# Patient Record
Sex: FEMALE | Race: WHITE | NOT HISPANIC OR LATINO | URBAN - METROPOLITAN AREA
[De-identification: names, ages, dates, MRNs, and addresses within clinical notes are randomized per-mention and may not be internally consistent; named-entity substitution may affect disease eponyms.]

---

## 2022-07-19 PROBLEM — E66.3 OVERWEIGHT: Status: ACTIVE | Noted: 2020-08-04

## 2022-08-23 ENCOUNTER — OFFICE (OUTPATIENT)
Dept: URBAN - METROPOLITAN AREA PATHOLOGY 1 | Facility: PATHOLOGY | Age: 67
End: 2022-08-23
Payer: COMMERCIAL

## 2022-08-23 ENCOUNTER — AMBULATORY SURGICAL CENTER (OUTPATIENT)
Dept: URBAN - METROPOLITAN AREA SURGERY 4 | Facility: SURGERY | Age: 67
End: 2022-08-23
Payer: COMMERCIAL

## 2022-08-23 VITALS
HEART RATE: 69 BPM | OXYGEN SATURATION: 99 % | RESPIRATION RATE: 12 BRPM | DIASTOLIC BLOOD PRESSURE: 49 MMHG | RESPIRATION RATE: 11 BRPM | HEART RATE: 88 BPM | RESPIRATION RATE: 16 BRPM | RESPIRATION RATE: 16 BRPM | DIASTOLIC BLOOD PRESSURE: 41 MMHG | SYSTOLIC BLOOD PRESSURE: 93 MMHG | DIASTOLIC BLOOD PRESSURE: 76 MMHG | HEART RATE: 89 BPM | HEART RATE: 66 BPM | DIASTOLIC BLOOD PRESSURE: 58 MMHG | HEIGHT: 64 IN | HEART RATE: 79 BPM | HEART RATE: 77 BPM | HEART RATE: 81 BPM | SYSTOLIC BLOOD PRESSURE: 114 MMHG | SYSTOLIC BLOOD PRESSURE: 94 MMHG | DIASTOLIC BLOOD PRESSURE: 38 MMHG | HEIGHT: 64 IN | DIASTOLIC BLOOD PRESSURE: 46 MMHG | OXYGEN SATURATION: 98 % | HEART RATE: 79 BPM | SYSTOLIC BLOOD PRESSURE: 148 MMHG | DIASTOLIC BLOOD PRESSURE: 62 MMHG | DIASTOLIC BLOOD PRESSURE: 46 MMHG | OXYGEN SATURATION: 96 % | HEART RATE: 75 BPM | WEIGHT: 242 LBS | SYSTOLIC BLOOD PRESSURE: 94 MMHG | RESPIRATION RATE: 13 BRPM | SYSTOLIC BLOOD PRESSURE: 93 MMHG | OXYGEN SATURATION: 97 % | HEART RATE: 66 BPM | DIASTOLIC BLOOD PRESSURE: 49 MMHG | OXYGEN SATURATION: 98 % | SYSTOLIC BLOOD PRESSURE: 134 MMHG | SYSTOLIC BLOOD PRESSURE: 81 MMHG | DIASTOLIC BLOOD PRESSURE: 41 MMHG | HEART RATE: 89 BPM | TEMPERATURE: 97.8 F | SYSTOLIC BLOOD PRESSURE: 118 MMHG | HEART RATE: 81 BPM | SYSTOLIC BLOOD PRESSURE: 97 MMHG | DIASTOLIC BLOOD PRESSURE: 58 MMHG | DIASTOLIC BLOOD PRESSURE: 47 MMHG | SYSTOLIC BLOOD PRESSURE: 81 MMHG | SYSTOLIC BLOOD PRESSURE: 114 MMHG | SYSTOLIC BLOOD PRESSURE: 134 MMHG | HEART RATE: 80 BPM | HEART RATE: 75 BPM | HEART RATE: 80 BPM | SYSTOLIC BLOOD PRESSURE: 97 MMHG | HEART RATE: 106 BPM | DIASTOLIC BLOOD PRESSURE: 47 MMHG | RESPIRATION RATE: 12 BRPM | DIASTOLIC BLOOD PRESSURE: 43 MMHG | DIASTOLIC BLOOD PRESSURE: 76 MMHG | RESPIRATION RATE: 11 BRPM | SYSTOLIC BLOOD PRESSURE: 148 MMHG | RESPIRATION RATE: 10 BRPM | TEMPERATURE: 97.8 F | WEIGHT: 242 LBS | DIASTOLIC BLOOD PRESSURE: 43 MMHG | RESPIRATION RATE: 13 BRPM | OXYGEN SATURATION: 99 % | HEART RATE: 88 BPM | DIASTOLIC BLOOD PRESSURE: 62 MMHG | OXYGEN SATURATION: 97 % | OXYGEN SATURATION: 96 % | HEART RATE: 106 BPM | RESPIRATION RATE: 10 BRPM | DIASTOLIC BLOOD PRESSURE: 38 MMHG | HEART RATE: 77 BPM | HEART RATE: 69 BPM | SYSTOLIC BLOOD PRESSURE: 118 MMHG

## 2022-08-23 DIAGNOSIS — Z12.11 ENCOUNTER FOR SCREENING FOR MALIGNANT NEOPLASM OF COLON: ICD-10-CM

## 2022-08-23 DIAGNOSIS — D12.0 BENIGN NEOPLASM OF CECUM: ICD-10-CM

## 2022-08-23 DIAGNOSIS — D12.2 BENIGN NEOPLASM OF ASCENDING COLON: ICD-10-CM

## 2022-08-23 PROBLEM — K63.5 POLYP OF COLON: Status: ACTIVE | Noted: 2022-08-23

## 2022-08-23 PROCEDURE — 88305 TISSUE EXAM BY PATHOLOGIST: CPT | Performed by: PATHOLOGY

## 2022-08-23 PROCEDURE — 45385 COLONOSCOPY W/LESION REMOVAL: CPT | Mod: 33 | Performed by: INTERNAL MEDICINE

## 2022-08-29 LAB
PDF: PDF REPORT: (no result)
PDF: PDF REPORT: (no result)

## 2023-02-16 ENCOUNTER — OFFICE (OUTPATIENT)
Dept: URBAN - METROPOLITAN AREA CLINIC 13 | Facility: CLINIC | Age: 68
End: 2023-02-16
Payer: MEDICARE

## 2023-02-16 VITALS
HEIGHT: 64 IN | WEIGHT: 235 LBS | SYSTOLIC BLOOD PRESSURE: 142 MMHG | HEART RATE: 96 BPM | DIASTOLIC BLOOD PRESSURE: 86 MMHG | OXYGEN SATURATION: 97 %

## 2023-02-16 DIAGNOSIS — E11.43 TYPE 2 DIABETES MELLITUS WITH DIABETIC AUTONOMIC (POLY)NEURO: ICD-10-CM

## 2023-02-16 DIAGNOSIS — K21.00 GASTRO-ESOPHAGEAL REFLUX DISEASE WITH ESOPHAGITIS, WITHOUT B: ICD-10-CM

## 2023-02-16 DIAGNOSIS — R13.10 DYSPHAGIA, UNSPECIFIED: ICD-10-CM

## 2023-02-16 DIAGNOSIS — Z86.010 PERSONAL HISTORY OF COLONIC POLYPS: ICD-10-CM

## 2023-02-16 DIAGNOSIS — E66.01 MORBID (SEVERE) OBESITY DUE TO EXCESS CALORIES: ICD-10-CM

## 2023-02-16 PROCEDURE — 99214 OFFICE O/P EST MOD 30 MIN: CPT | Performed by: INTERNAL MEDICINE

## 2023-02-27 ENCOUNTER — OFFICE (OUTPATIENT)
Dept: URBAN - METROPOLITAN AREA PATHOLOGY 1 | Facility: PATHOLOGY | Age: 68
End: 2023-02-27
Payer: MEDICARE

## 2023-02-27 ENCOUNTER — AMBULATORY SURGICAL CENTER (OUTPATIENT)
Dept: URBAN - METROPOLITAN AREA SURGERY 4 | Facility: SURGERY | Age: 68
End: 2023-02-27
Payer: MEDICARE

## 2023-02-27 VITALS
DIASTOLIC BLOOD PRESSURE: 68 MMHG | SYSTOLIC BLOOD PRESSURE: 109 MMHG | SYSTOLIC BLOOD PRESSURE: 110 MMHG | DIASTOLIC BLOOD PRESSURE: 60 MMHG | HEART RATE: 106 BPM | HEART RATE: 88 BPM | DIASTOLIC BLOOD PRESSURE: 78 MMHG | DIASTOLIC BLOOD PRESSURE: 77 MMHG | HEART RATE: 92 BPM | RESPIRATION RATE: 16 BRPM | OXYGEN SATURATION: 96 % | RESPIRATION RATE: 4 BRPM | HEIGHT: 64 IN | OXYGEN SATURATION: 96 % | SYSTOLIC BLOOD PRESSURE: 109 MMHG | HEART RATE: 84 BPM | TEMPERATURE: 98.1 F | HEART RATE: 90 BPM | RESPIRATION RATE: 4 BRPM | HEART RATE: 90 BPM | OXYGEN SATURATION: 95 % | SYSTOLIC BLOOD PRESSURE: 119 MMHG | HEART RATE: 106 BPM | OXYGEN SATURATION: 232 % | HEART RATE: 84 BPM | OXYGEN SATURATION: 95 % | WEIGHT: 232 LBS | RESPIRATION RATE: 16 BRPM | SYSTOLIC BLOOD PRESSURE: 122 MMHG | OXYGEN SATURATION: 97 % | DIASTOLIC BLOOD PRESSURE: 64 MMHG | OXYGEN SATURATION: 98 % | DIASTOLIC BLOOD PRESSURE: 64 MMHG | OXYGEN SATURATION: 232 % | SYSTOLIC BLOOD PRESSURE: 113 MMHG | RESPIRATION RATE: 5 BRPM | OXYGEN SATURATION: 98 % | DIASTOLIC BLOOD PRESSURE: 68 MMHG | TEMPERATURE: 98.1 F | HEART RATE: 88 BPM | SYSTOLIC BLOOD PRESSURE: 113 MMHG | SYSTOLIC BLOOD PRESSURE: 110 MMHG | SYSTOLIC BLOOD PRESSURE: 119 MMHG | OXYGEN SATURATION: 97 % | HEART RATE: 92 BPM | HEIGHT: 64 IN | DIASTOLIC BLOOD PRESSURE: 77 MMHG | WEIGHT: 232 LBS | SYSTOLIC BLOOD PRESSURE: 122 MMHG | RESPIRATION RATE: 5 BRPM | DIASTOLIC BLOOD PRESSURE: 78 MMHG | DIASTOLIC BLOOD PRESSURE: 60 MMHG

## 2023-02-27 DIAGNOSIS — K29.30 CHRONIC SUPERFICIAL GASTRITIS WITHOUT BLEEDING: ICD-10-CM

## 2023-02-27 DIAGNOSIS — R13.10 DYSPHAGIA, UNSPECIFIED: ICD-10-CM

## 2023-02-27 DIAGNOSIS — Z48.815 ENCOUNTER FOR SURGICAL AFTERCARE FOLLOWING SURGERY ON THE DI: ICD-10-CM

## 2023-02-27 PROBLEM — K31.89 OTHER DISEASES OF STOMACH AND DUODENUM: Status: ACTIVE | Noted: 2023-02-27

## 2023-02-27 PROCEDURE — 43239 EGD BIOPSY SINGLE/MULTIPLE: CPT | Performed by: INTERNAL MEDICINE

## 2023-02-27 PROCEDURE — 43450 DILATE ESOPHAGUS 1/MULT PASS: CPT | Performed by: INTERNAL MEDICINE

## 2023-02-27 PROCEDURE — 88305 TISSUE EXAM BY PATHOLOGIST: CPT | Performed by: PATHOLOGY

## 2023-02-27 PROCEDURE — 88312 SPECIAL STAINS GROUP 1: CPT | Performed by: PATHOLOGY

## 2023-03-02 LAB
PDF: PDF REPORT: (no result)
PDF: PDF REPORT: (no result)

## 2023-03-13 ENCOUNTER — OFFICE (OUTPATIENT)
Dept: URBAN - METROPOLITAN AREA CLINIC 13 | Facility: CLINIC | Age: 68
End: 2023-03-13
Payer: MEDICARE

## 2023-03-13 VITALS
HEIGHT: 64 IN | WEIGHT: 235 LBS | DIASTOLIC BLOOD PRESSURE: 72 MMHG | OXYGEN SATURATION: 97 % | SYSTOLIC BLOOD PRESSURE: 120 MMHG | HEART RATE: 94 BPM

## 2023-03-13 DIAGNOSIS — E11.43 TYPE 2 DIABETES MELLITUS WITH DIABETIC AUTONOMIC (POLY)NEURO: ICD-10-CM

## 2023-03-13 DIAGNOSIS — E66.01 MORBID (SEVERE) OBESITY DUE TO EXCESS CALORIES: ICD-10-CM

## 2023-03-13 DIAGNOSIS — K76.0 FATTY (CHANGE OF) LIVER, NOT ELSEWHERE CLASSIFIED: ICD-10-CM

## 2023-03-13 DIAGNOSIS — R11.0 NAUSEA: ICD-10-CM

## 2023-03-13 DIAGNOSIS — K21.9 GASTRO-ESOPHAGEAL REFLUX DISEASE WITHOUT ESOPHAGITIS: ICD-10-CM

## 2023-03-13 DIAGNOSIS — R13.10 DYSPHAGIA, UNSPECIFIED: ICD-10-CM

## 2023-03-13 PROCEDURE — 99214 OFFICE O/P EST MOD 30 MIN: CPT | Performed by: INTERNAL MEDICINE

## 2023-03-25 LAB
ACTIN ANTIBODY (IGG): <20 EIA
ALPHA-1-ANTITRYPSIN (AAT) PHENOTYPE: ALPHA-1-ANTITRYPSIN PHENOTYPE: (no result)
ANTI-MITOCHONDRIAL AB: NEGATIVE
ANTINUCLEAR AB BY IFA WITH REFLEX: ANA SCREEN: (no result)
CBC  AND  PLATELET COUNT: HEMATOCRIT: 40.9 %
CBC  AND  PLATELET COUNT: HEMOGLOBIN: 13.6 G/DL
CBC  AND  PLATELET COUNT: MCH: 30.6 PG
CBC  AND  PLATELET COUNT: MCHC: 33.3 G/DL
CBC  AND  PLATELET COUNT: MCV: 91.9 FL
CBC  AND  PLATELET COUNT: MPV: 10.9 FL
CBC  AND  PLATELET COUNT: PLATELET COUNT: 251 K/UL
CBC  AND  PLATELET COUNT: RBC: 4.45 M/UL
CBC  AND  PLATELET COUNT: RDW: 12.5 %
CBC  AND  PLATELET COUNT: WBC COUNT: 5.1 K/UL
COMPREHENSIVE METABOLIC PANEL: A/G RATIO: 1.8 RATIO
COMPREHENSIVE METABOLIC PANEL: ALBUMIN: 4.1 G/DL
COMPREHENSIVE METABOLIC PANEL: ALK PHOSPHATASE: 85 U/L
COMPREHENSIVE METABOLIC PANEL: ALT: 16 U/L
COMPREHENSIVE METABOLIC PANEL: AST: 17 U/L
COMPREHENSIVE METABOLIC PANEL: BILIRUBIN,TOTAL: 0.3 MG/DL
COMPREHENSIVE METABOLIC PANEL: BLOOD UREA NITROGEN: 15 MG/DL
COMPREHENSIVE METABOLIC PANEL: BUN/CREAT RATIO: 21
COMPREHENSIVE METABOLIC PANEL: CALCIUM: 9.7 MG/DL
COMPREHENSIVE METABOLIC PANEL: CHLORIDE: 95 MEQ/L — LOW
COMPREHENSIVE METABOLIC PANEL: CO2: 27 MEQ/L
COMPREHENSIVE METABOLIC PANEL: CREATININE: 0.7 MG/DL
COMPREHENSIVE METABOLIC PANEL: FASTING STATUS: (no result)
COMPREHENSIVE METABOLIC PANEL: GLOBULIN: 2.3 G/DL
COMPREHENSIVE METABOLIC PANEL: GLOMERULAR FILTRATION RATE (GFR): 96 MLS/MIN/1.73M2
COMPREHENSIVE METABOLIC PANEL: GLUCOSE,RANDOM: 377 MG/DL — HIGH
COMPREHENSIVE METABOLIC PANEL: POTASSIUM: 4.7 MEQ/L
COMPREHENSIVE METABOLIC PANEL: SODIUM: 132 MEQ/L — LOW
COMPREHENSIVE METABOLIC PANEL: TOTAL PROTEIN: 6.4 G/DL
FERRITIN: 45 NG/ML
HEPATITIS ACUTE PANEL: HEP B CORE IGM: NEGATIVE
HEPATITIS ACUTE PANEL: HEP B SURFACE AG W/CONFIRMATION: NEGATIVE
HEPATITIS ACUTE PANEL: HEPATITIS A IGM ANTIBODY: NEGATIVE
HEPATITIS ACUTE PANEL: HEPATITIS C ANTIBODY: NEGATIVE
IRON PROFILE: IRON BINDING CAPACITY: 331 MCG/DL
IRON PROFILE: IRON SATURATION: 17 %
IRON PROFILE: IRON: 57 MCG/DL
PROTIME: INR: 0.9
PROTIME: PT: 11.5 SEC — LOW

## 2023-10-10 ENCOUNTER — OFFICE (OUTPATIENT)
Dept: URBAN - METROPOLITAN AREA CLINIC 13 | Facility: CLINIC | Age: 68
End: 2023-10-10
Payer: MEDICARE

## 2023-10-10 VITALS
DIASTOLIC BLOOD PRESSURE: 68 MMHG | SYSTOLIC BLOOD PRESSURE: 114 MMHG | HEART RATE: 94 BPM | OXYGEN SATURATION: 95 % | HEIGHT: 64 IN | WEIGHT: 240 LBS

## 2023-10-10 DIAGNOSIS — K76.0 FATTY (CHANGE OF) LIVER, NOT ELSEWHERE CLASSIFIED: ICD-10-CM

## 2023-10-10 DIAGNOSIS — K31.84 GASTROPARESIS: ICD-10-CM

## 2023-10-10 DIAGNOSIS — K21.9 GASTRO-ESOPHAGEAL REFLUX DISEASE WITHOUT ESOPHAGITIS: ICD-10-CM

## 2023-10-10 DIAGNOSIS — R13.10 DYSPHAGIA, UNSPECIFIED: ICD-10-CM

## 2023-10-10 PROCEDURE — 99214 OFFICE O/P EST MOD 30 MIN: CPT | Performed by: INTERNAL MEDICINE

## 2023-11-08 ENCOUNTER — OFFICE (OUTPATIENT)
Dept: URBAN - METROPOLITAN AREA CLINIC 18 | Facility: CLINIC | Age: 68
End: 2023-11-08
Payer: MEDICARE

## 2023-11-08 VITALS — HEIGHT: 64 IN

## 2023-11-08 DIAGNOSIS — K76.0 FATTY (CHANGE OF) LIVER, NOT ELSEWHERE CLASSIFIED: ICD-10-CM

## 2023-11-08 PROCEDURE — 76981 USE PARENCHYMA: CPT | Performed by: INTERNAL MEDICINE

## 2024-01-17 ENCOUNTER — OFFICE (OUTPATIENT)
Dept: URBAN - METROPOLITAN AREA CLINIC 13 | Facility: CLINIC | Age: 69
End: 2024-01-17
Payer: MEDICARE

## 2024-01-17 VITALS
HEART RATE: 76 BPM | SYSTOLIC BLOOD PRESSURE: 110 MMHG | DIASTOLIC BLOOD PRESSURE: 78 MMHG | WEIGHT: 246 LBS | HEIGHT: 64 IN

## 2024-01-17 DIAGNOSIS — K21.9 GASTRO-ESOPHAGEAL REFLUX DISEASE WITHOUT ESOPHAGITIS: ICD-10-CM

## 2024-01-17 DIAGNOSIS — K31.84 GASTROPARESIS: ICD-10-CM

## 2024-01-17 DIAGNOSIS — K76.0 FATTY (CHANGE OF) LIVER, NOT ELSEWHERE CLASSIFIED: ICD-10-CM

## 2024-01-17 DIAGNOSIS — E66.01 MORBID (SEVERE) OBESITY DUE TO EXCESS CALORIES: ICD-10-CM

## 2024-01-17 PROCEDURE — 99214 OFFICE O/P EST MOD 30 MIN: CPT | Performed by: INTERNAL MEDICINE

## 2024-07-18 ENCOUNTER — OFFICE (OUTPATIENT)
Dept: URBAN - METROPOLITAN AREA CLINIC 13 | Facility: CLINIC | Age: 69
End: 2024-07-18
Payer: MEDICARE

## 2024-07-18 VITALS
OXYGEN SATURATION: 96 % | DIASTOLIC BLOOD PRESSURE: 62 MMHG | SYSTOLIC BLOOD PRESSURE: 122 MMHG | HEART RATE: 92 BPM | WEIGHT: 243 LBS | HEIGHT: 64 IN

## 2024-07-18 DIAGNOSIS — K21.9 GASTRO-ESOPHAGEAL REFLUX DISEASE WITHOUT ESOPHAGITIS: ICD-10-CM

## 2024-07-18 DIAGNOSIS — K76.0 FATTY (CHANGE OF) LIVER, NOT ELSEWHERE CLASSIFIED: ICD-10-CM

## 2024-07-18 DIAGNOSIS — E66.01 MORBID (SEVERE) OBESITY DUE TO EXCESS CALORIES: ICD-10-CM

## 2024-07-18 DIAGNOSIS — K31.84 GASTROPARESIS: ICD-10-CM

## 2024-07-18 PROCEDURE — 99214 OFFICE O/P EST MOD 30 MIN: CPT | Performed by: INTERNAL MEDICINE

## 2025-01-15 ENCOUNTER — OFFICE (OUTPATIENT)
Dept: URBAN - METROPOLITAN AREA CLINIC 13 | Facility: CLINIC | Age: 70
End: 2025-01-15
Payer: MEDICARE

## 2025-01-15 VITALS
OXYGEN SATURATION: 99 % | DIASTOLIC BLOOD PRESSURE: 80 MMHG | SYSTOLIC BLOOD PRESSURE: 138 MMHG | HEART RATE: 82 BPM | WEIGHT: 249 LBS | HEIGHT: 64 IN

## 2025-01-15 DIAGNOSIS — Z86.0100 PERSONAL HISTORY OF COLON POLYPS, UNSPECIFIED: ICD-10-CM

## 2025-01-15 DIAGNOSIS — K76.0 FATTY (CHANGE OF) LIVER, NOT ELSEWHERE CLASSIFIED: ICD-10-CM

## 2025-01-15 DIAGNOSIS — E66.01 MORBID (SEVERE) OBESITY DUE TO EXCESS CALORIES: ICD-10-CM

## 2025-01-15 DIAGNOSIS — K21.9 GASTRO-ESOPHAGEAL REFLUX DISEASE WITHOUT ESOPHAGITIS: ICD-10-CM

## 2025-01-15 PROCEDURE — 99214 OFFICE O/P EST MOD 30 MIN: CPT | Performed by: INTERNAL MEDICINE

## 2025-01-15 NOTE — SERVICEHPINOTES
Silver Mendes   is seen today for a follow-up visit.     G gastro/NexGen/referral data is reviewed prior to office visit/consultation. Past medical history, medications, surgical history family history and social history are reviewed on this visit. Patient has steatosis F 0–1 S3. She also has gastroparesis and GERD.She had noticed when she was here last time her reflux seem to be worse she initially thought it was her morphine but actually was oral magnesium supplements.Mag-Ox does not give her GERD other forms tab.She has also figured out the right seems to activate her irritable bowel symptoms.Rare nausea.Scurry 1 stool daily no straining.

## 2025-03-13 LAB
CBC  AND  PLATELET COUNT: HEMATOCRIT: 40.3 %
CBC  AND  PLATELET COUNT: HEMOGLOBIN: 13.2 G/DL
CBC  AND  PLATELET COUNT: MCH: 30.6 PG
CBC  AND  PLATELET COUNT: MCHC: 32.8 G/DL
CBC  AND  PLATELET COUNT: MCV: 93.3 FL
CBC  AND  PLATELET COUNT: MPV: 10.3 FL
CBC  AND  PLATELET COUNT: PLATELET COUNT: 260 K/UL
CBC  AND  PLATELET COUNT: RBC: 4.32 M/UL
CBC  AND  PLATELET COUNT: RDW: 12.4 %
CBC  AND  PLATELET COUNT: WBC COUNT: 6 K/UL
COMPREHENSIVE METABOLIC PANEL: A/G RATIO: 1.6 RATIO
COMPREHENSIVE METABOLIC PANEL: ALBUMIN: 4.2 G/DL
COMPREHENSIVE METABOLIC PANEL: ALK PHOSPHATASE: 90 U/L
COMPREHENSIVE METABOLIC PANEL: ALT: 15 U/L
COMPREHENSIVE METABOLIC PANEL: AST: 15 U/L
COMPREHENSIVE METABOLIC PANEL: BILIRUBIN,TOTAL: 0.3 MG/DL
COMPREHENSIVE METABOLIC PANEL: BLOOD UREA NITROGEN: 19 MG/DL
COMPREHENSIVE METABOLIC PANEL: BUN/CREAT RATIO: 24
COMPREHENSIVE METABOLIC PANEL: CALCIUM: 9.6 MG/DL
COMPREHENSIVE METABOLIC PANEL: CHLORIDE: 100 MEQ/L
COMPREHENSIVE METABOLIC PANEL: CO2: 30 MEQ/L
COMPREHENSIVE METABOLIC PANEL: CREATININE: 0.8 MG/DL
COMPREHENSIVE METABOLIC PANEL: FASTING STATUS: (no result)
COMPREHENSIVE METABOLIC PANEL: GLOBULIN: 2.6 G/DL
COMPREHENSIVE METABOLIC PANEL: GLOMERULAR FILTRATION RATE (GFR): 81 MLS/MIN/1.73M2
COMPREHENSIVE METABOLIC PANEL: GLUCOSE,RANDOM: 114 MG/DL — HIGH
COMPREHENSIVE METABOLIC PANEL: POTASSIUM: 4.7 MEQ/L
COMPREHENSIVE METABOLIC PANEL: SODIUM: 140 MEQ/L
COMPREHENSIVE METABOLIC PANEL: TOTAL PROTEIN: 6.8 G/DL